# Patient Record
Sex: FEMALE | URBAN - METROPOLITAN AREA
[De-identification: names, ages, dates, MRNs, and addresses within clinical notes are randomized per-mention and may not be internally consistent; named-entity substitution may affect disease eponyms.]

---

## 2017-06-12 ENCOUNTER — IMPORTED ENCOUNTER (OUTPATIENT)
Dept: URBAN - METROPOLITAN AREA CLINIC 9 | Facility: CLINIC | Age: 82
End: 2017-06-12

## 2017-08-25 ENCOUNTER — IMPORTED ENCOUNTER (OUTPATIENT)
Dept: URBAN - METROPOLITAN AREA CLINIC 9 | Facility: CLINIC | Age: 82
End: 2017-08-25

## 2017-12-13 ENCOUNTER — IMPORTED ENCOUNTER (OUTPATIENT)
Dept: URBAN - METROPOLITAN AREA CLINIC 9 | Facility: CLINIC | Age: 82
End: 2017-12-13

## 2018-06-05 NOTE — PROCEDURE NOTE: CLINICAL
PROCEDURE NOTE: Biopsy of Eyelid Left Lower Lid. Diagnosis: Eyelid Lesion, Uncertain Behavior. Prior to treatment, the risks/benefits/alternatives were discussed. The patient wished to proceed with procedure. The area of the surgical site was prepped surgical scrub. 0.5 cc of 2% lidocaine with epinephrine was injected beneath the lesion. The lesion was excised with Yaritza scissors. The defect was cauterized. Erythromycin ointment was placed on the biopsy site. Patient tolerated procedure well. There were no complications. The lesion was placed in formalin and sent to a pathology lab. Post procedure instructions given. Yobany Huang

## 2018-06-20 ENCOUNTER — IMPORTED ENCOUNTER (OUTPATIENT)
Dept: URBAN - METROPOLITAN AREA CLINIC 9 | Facility: CLINIC | Age: 83
End: 2018-06-20

## 2018-08-03 ENCOUNTER — IMPORTED ENCOUNTER (OUTPATIENT)
Dept: URBAN - METROPOLITAN AREA CLINIC 9 | Facility: CLINIC | Age: 83
End: 2018-08-03

## 2019-06-19 ENCOUNTER — IMPORTED ENCOUNTER (OUTPATIENT)
Dept: URBAN - METROPOLITAN AREA CLINIC 9 | Facility: CLINIC | Age: 84
End: 2019-06-19

## 2019-12-06 ENCOUNTER — IMPORTED ENCOUNTER (OUTPATIENT)
Dept: URBAN - METROPOLITAN AREA CLINIC 9 | Facility: CLINIC | Age: 84
End: 2019-12-06

## 2020-06-10 ENCOUNTER — IMPORTED ENCOUNTER (OUTPATIENT)
Dept: URBAN - METROPOLITAN AREA CLINIC 9 | Facility: CLINIC | Age: 85
End: 2020-06-10

## 2021-10-16 ASSESSMENT — KERATOMETRY
OS_AXISANGLE_DEGREES: 26
OD_K2POWER_DIOPTERS: 42.75
OD_AXISANGLE_DEGREES: 178
OS_K2POWER_DIOPTERS: 43.75
OS_K1POWER_DIOPTERS: 43.5
OD_K2POWER_DIOPTERS: 44.25
OS_K1POWER_DIOPTERS: 43.75
OD_AXISANGLE_DEGREES: 11
OS_K2POWER_DIOPTERS: 44.25
OS_AXISANGLE2_DEGREES: 133
OS_K2POWER_DIOPTERS: 43.75
OD_AXISANGLE_DEGREES: 180
OS_K1POWER_DIOPTERS: 43.75
OS_AXISANGLE_DEGREES: 43
OD_K2POWER_DIOPTERS: 4
OS_K1POWER_DIOPTERS: 43.5
OD_AXISANGLE2_DEGREES: 101
OS_AXISANGLE2_DEGREES: 144
OD_K1POWER_DIOPTERS: 44
OD_AXISANGLE_DEGREES: 172
OD_K1POWER_DIOPTERS: 42.75
OD_AXISANGLE2_DEGREES: 88
OD_AXISANGLE2_DEGREES: 90
OD_K1POWER_DIOPTERS: 43.75
OS_AXISANGLE_DEGREES: 141
OS_AXISANGLE2_DEGREES: 51
OS_K2POWER_DIOPTERS: 44.25
OD_AXISANGLE2_DEGREES: 82
OS_AXISANGLE_DEGREES: 54
OD_K1POWER_DIOPTERS: 43.75
OS_AXISANGLE2_DEGREES: 116
OD_K2POWER_DIOPTERS: 44.25

## 2021-10-16 ASSESSMENT — VISUAL ACUITY
OS_CC: 20/25 SN
OD_SC: 20/40 -2 SN
OD_CC: 20/25 -2 SN
OS_CC: 20/30 SN
OD_CC: 20/25 - SN
OD_SC: 20/30 -2 SN
OD_SC: 20/40 SN
OD_CC: 20/30 SN
OS_SC: 20/25 - SN
OD_SC: 20/30 -2 SN
OD_SC: 20/40 + SN
OD_CC: 20/25 -2 SN
OS_CC: 20/25 SN
OD_CC: 20/30 - SN
OS_SC: 20/25 -2 SN
OS_SC: 20/25 - SN
OS_CC: 20/20 - SN
OS_SC: 20/25 + SN
OD_CC: 20/30 SN
OS_CC: 20/20 -2 SN
OS_SC: 20/25 -2 SN
OD_CC: 20/25 SN
OS_CC: 20/20 -2 SN
OS_SC: 20/25 - SN
OD_SC: 20/30 - SN

## 2021-10-16 ASSESSMENT — TONOMETRY
OS_IOP_MMHG: 16
OD_IOP_MMHG: 16
OS_IOP_MMHG: 15
OS_IOP_MMHG: 14
OS_IOP_MMHG: 15
OS_IOP_MMHG: 13
OD_IOP_MMHG: 14
OD_IOP_MMHG: 12
OD_IOP_MMHG: 15
OS_IOP_MMHG: 15
OD_IOP_MMHG: 15
OD_IOP_MMHG: 15

## 2021-10-16 ASSESSMENT — PACHYMETRY
OS_CT_UM: 480.0
OD_CT_UM: 484.0

## 2022-06-22 RX ORDER — LEVOTHYROXINE SODIUM 0.07 MG/1
TABLET ORAL
COMMUNITY

## 2022-06-22 RX ORDER — METOPROLOL SUCCINATE 25 MG/1
TABLET, EXTENDED RELEASE ORAL
COMMUNITY